# Patient Record
Sex: FEMALE | Race: WHITE | URBAN - METROPOLITAN AREA
[De-identification: names, ages, dates, MRNs, and addresses within clinical notes are randomized per-mention and may not be internally consistent; named-entity substitution may affect disease eponyms.]

---

## 2020-07-10 ENCOUNTER — TELEPHONE ENCOUNTER (OUTPATIENT)
Dept: URBAN - METROPOLITAN AREA CLINIC 74 | Facility: CLINIC | Age: 30
End: 2020-07-10

## 2020-07-29 ENCOUNTER — WEB ENCOUNTER (OUTPATIENT)
Dept: URBAN - METROPOLITAN AREA CLINIC 74 | Facility: CLINIC | Age: 30
End: 2020-07-29

## 2020-07-29 ENCOUNTER — OFFICE VISIT (OUTPATIENT)
Dept: URBAN - METROPOLITAN AREA CLINIC 74 | Facility: CLINIC | Age: 30
End: 2020-07-29
Payer: COMMERCIAL

## 2020-07-29 DIAGNOSIS — K80.20 GALLSTONES: ICD-10-CM

## 2020-07-29 DIAGNOSIS — K52.9 GASTROENTERITIS: ICD-10-CM

## 2020-07-29 DIAGNOSIS — Z86.2 HISTORY OF IRON DEFICIENCY ANEMIA: ICD-10-CM

## 2020-07-29 DIAGNOSIS — K50.111 CROHN'S DISEASE OF COLON WITH RECTAL BLEEDING: ICD-10-CM

## 2020-07-29 DIAGNOSIS — D18.03 LIVER HEMANGIOMA: ICD-10-CM

## 2020-07-29 DIAGNOSIS — E66.01 MORBID OBESITY DUE TO EXCESS CALORIES: ICD-10-CM

## 2020-07-29 PROCEDURE — G8419 CALC BMI OUT NRM PARAM NOF/U: HCPCS | Performed by: INTERNAL MEDICINE

## 2020-07-29 PROCEDURE — 99214 OFFICE O/P EST MOD 30 MIN: CPT | Performed by: INTERNAL MEDICINE

## 2020-07-29 PROCEDURE — 1036F TOBACCO NON-USER: CPT | Performed by: INTERNAL MEDICINE

## 2020-07-29 PROCEDURE — G8427 DOCREV CUR MEDS BY ELIG CLIN: HCPCS | Performed by: INTERNAL MEDICINE

## 2020-07-29 RX ORDER — USTEKINUMAB 90 MG/ML
INJECT 1 MILLILITER (90 MG) BY SUBCUTANEOUS ROUTE EVERY 8 WEEKS FOR 90 DAYS INJECTION, SOLUTION SUBCUTANEOUS
Qty: 2 | Refills: 3 | Status: ACTIVE | COMMUNITY
Start: 2020-04-10 | End: 2021-04-05

## 2020-07-29 RX ORDER — ESCITALOPRAM OXALATE 10 MG/1
1 TABLET TABLET, FILM COATED ORAL ONCE A DAY
Refills: 0 | Status: ACTIVE | COMMUNITY
Start: 1900-01-01

## 2020-07-29 RX ORDER — USTEKINUMAB 90 MG/ML
INJECT 1 MILLILITER (90 MG) BY SUBCUTANEOUS ROUTE EVERY 8 WEEKS FOR 90 DAYS INJECTION, SOLUTION SUBCUTANEOUS
OUTPATIENT
Start: 2020-04-10 | End: 2021-04-05

## 2020-07-29 NOTE — PHYSICAL EXAM CONSTITUTIONAL:
Morbid obesity, well developed, well nourished , in no acute distress , ambulating without difficulty , normal communication ability

## 2020-07-29 NOTE — HPI-TODAY'S VISIT:
Today July 29, 2020 the patient returns for a follow-up visit, the patient was last seen on April 14, 2020 during a telephone visit after having a colonoscopy that showed minimal active colitis compatible with Crohn's disease of the colon the terminal ileal mucosa was normal.  At the time the patient was having normal BMs, no rectal bleeding, she was concerned because she had been exposed to a family member with COVID19, she wanted to hold the Stelara for 2 weeks I discussed with her the current recommendations but she decided to hold the Stelara for 2 weeks, she was to return for a follow-up visit in 2 months. The patient now returns complaining of 2 days of intense diarrhea, she claims that she never stopped the Stelara, has not been exposed to COVID, has diffuse cramping abdominal pain, denies any hematochezia, has slight nausea with no vomiting.  There is no history of recent antibiotic use or travel. The patient will start clear liquids, will use Imodium over-the-counter 1 tablet every 4-6 hours as needed, we will obtain stool for pathogens, a CBC, CMP, CRP. The patient will contact the office in 24 hours to report progress, if not improving we may start prednisone 40 mg, obtain a CT of abdomen and pelvis, may need to start antibiotic therapy.

## 2020-07-30 LAB
A/G RATIO: 1.4
ALBUMIN: 4.5
ALKALINE PHOSPHATASE: 74
ALT (SGPT): 13
AST (SGOT): 15
BASO (ABSOLUTE): 0.1
BASOS: 1
BILIRUBIN, TOTAL: 0.4
BUN/CREATININE RATIO: 9
BUN: 8
C-REACTIVE PROTEIN, QUANT: 58
CALCIUM: 9.2
CARBON DIOXIDE, TOTAL: 22
CHLORIDE: 99
CREATININE: 0.93
EGFR IF AFRICN AM: 95
EGFR IF NONAFRICN AM: 82
EOS (ABSOLUTE): 0.2
EOS: 1
GLOBULIN, TOTAL: 3.2
GLUCOSE: 91
HEMATOCRIT: 40.9
HEMATOLOGY COMMENTS:: (no result)
HEMOGLOBIN: 13.5
IMMATURE CELLS: (no result)
IMMATURE GRANS (ABS): 0
IMMATURE GRANULOCYTES: 0
LYMPHS (ABSOLUTE): 1.3
LYMPHS: 11
MCH: 27.8
MCHC: 33
MCV: 84
MONOCYTES(ABSOLUTE): 0.8
MONOCYTES: 7
NEUTROPHILS (ABSOLUTE): 9.9
NEUTROPHILS: 80
NRBC: (no result)
PLATELETS: 368
POTASSIUM: 4.2
PROTEIN, TOTAL: 7.7
RBC: 4.85
RDW: 12
SODIUM: 138
WBC: 12.2

## 2020-08-06 ENCOUNTER — TELEPHONE ENCOUNTER (OUTPATIENT)
Dept: URBAN - METROPOLITAN AREA CLINIC 74 | Facility: CLINIC | Age: 30
End: 2020-08-06

## 2020-08-06 RX ORDER — USTEKINUMAB 90 MG/ML
INJECT 1 MILLILITER (90 MG) BY SUBCUTANEOUS ROUTE EVERY 8 WEEKS FOR 90 DAYS INJECTION, SOLUTION SUBCUTANEOUS
Status: ACTIVE | COMMUNITY
Start: 2020-04-10 | End: 2021-04-05

## 2020-08-06 RX ORDER — PREDNISONE 5 MG/1
8 TABLETS FOR 7 DAYS, TAPER DOWN 1 TAB EVERY SEVEN DAYS TABLET ORAL ONCE A DAY
Qty: 200 | Refills: 2 | OUTPATIENT
Start: 2020-08-06 | End: 2020-11-03

## 2020-08-06 RX ORDER — ESCITALOPRAM OXALATE 10 MG/1
1 TABLET TABLET, FILM COATED ORAL ONCE A DAY
Refills: 0 | Status: ACTIVE | COMMUNITY
Start: 1900-01-01

## 2020-08-11 ENCOUNTER — OFFICE VISIT (OUTPATIENT)
Dept: URBAN - METROPOLITAN AREA CLINIC 74 | Facility: CLINIC | Age: 30
End: 2020-08-11

## 2020-08-12 PROBLEM — 47812002: Status: ACTIVE | Noted: 2020-08-10

## 2020-08-13 ENCOUNTER — OFFICE VISIT (OUTPATIENT)
Dept: URBAN - METROPOLITAN AREA CLINIC 74 | Facility: CLINIC | Age: 30
End: 2020-08-13

## 2020-08-26 ENCOUNTER — OFFICE VISIT (OUTPATIENT)
Dept: URBAN - METROPOLITAN AREA CLINIC 74 | Facility: CLINIC | Age: 30
End: 2020-08-26
Payer: COMMERCIAL

## 2020-08-26 DIAGNOSIS — E66.01 MORBID OBESITY: ICD-10-CM

## 2020-08-26 DIAGNOSIS — A02.0 SALMONELLA DYSENTERY: ICD-10-CM

## 2020-08-26 DIAGNOSIS — K50.118 CROHN'S DISEASE OF LARGE INTESTINE WITH OTHER COMPLICATION: ICD-10-CM

## 2020-08-26 DIAGNOSIS — Z86.2 HISTORY OF ANEMIA: ICD-10-CM

## 2020-08-26 PROBLEM — 42338000: Status: ACTIVE | Noted: 2020-08-12

## 2020-08-26 PROCEDURE — 1036F TOBACCO NON-USER: CPT | Performed by: INTERNAL MEDICINE

## 2020-08-26 PROCEDURE — G8419 CALC BMI OUT NRM PARAM NOF/U: HCPCS | Performed by: INTERNAL MEDICINE

## 2020-08-26 PROCEDURE — 99213 OFFICE O/P EST LOW 20 MIN: CPT | Performed by: INTERNAL MEDICINE

## 2020-08-26 PROCEDURE — G8427 DOCREV CUR MEDS BY ELIG CLIN: HCPCS | Performed by: INTERNAL MEDICINE

## 2020-08-26 RX ORDER — USTEKINUMAB 90 MG/ML
INJECT 1 MILLILITER (90 MG) BY SUBCUTANEOUS ROUTE EVERY 8 WEEKS FOR 90 DAYS INJECTION, SOLUTION SUBCUTANEOUS
Status: ACTIVE | COMMUNITY
Start: 2020-04-10 | End: 2021-04-05

## 2020-08-26 RX ORDER — USTEKINUMAB 90 MG/ML
INJECT 1 MILLILITER (90 MG) BY SUBCUTANEOUS ROUTE EVERY 8 WEEKS FOR 90 DAYS INJECTION, SOLUTION SUBCUTANEOUS
OUTPATIENT
Start: 2020-04-10 | End: 2021-04-05

## 2020-08-26 RX ORDER — PREDNISONE 5 MG/1
8 TABLETS FOR 7 DAYS, TAPER DOWN 1 TAB EVERY SEVEN DAYS TABLET ORAL ONCE A DAY
OUTPATIENT
Start: 2020-08-06 | End: 2020-11-03

## 2020-08-26 RX ORDER — ESCITALOPRAM OXALATE 10 MG/1
1 TABLET TABLET, FILM COATED ORAL ONCE A DAY
Refills: 0 | Status: ACTIVE | COMMUNITY
Start: 1900-01-01

## 2020-08-26 RX ORDER — PREDNISONE 5 MG/1
8 TABLETS FOR 7 DAYS, TAPER DOWN 1 TAB EVERY SEVEN DAYS TABLET ORAL ONCE A DAY
Qty: 200 | Refills: 2 | Status: ACTIVE | COMMUNITY
Start: 2020-08-06 | End: 2020-11-03

## 2020-08-26 NOTE — HPI-TODAY'S VISIT:
Today July 29, 2020 the patient returns for a follow-up visit, the patient was last seen on April 14, 2020 during a telephone visit after having a colonoscopy that showed minimal active colitis compatible with Crohn's disease of the colon the terminal ileal mucosa was normal.  At the time the patient was having normal BMs, no rectal bleeding, she was concerned because she had been exposed to a family member with COVID19, she wanted to hold the Stelara for 2 weeks I discussed with her the current recommendations but she decided to hold the Stelara for 2 weeks, she was to return for a follow-up visit in 2 months. The patient now returns complaining of 2 days of intense diarrhea, she claims that she never stopped the Stelara, has not been exposed to COVID, has diffuse cramping abdominal pain, denies any hematochezia, has slight nausea with no vomiting.  There is no history of recent antibiotic use or travel. The patient will start clear liquids, will use Imodium over-the-counter 1 tablet every 4-6 hours as needed, we will obtain stool for pathogens, a CBC, CMP, CRP. The patient will contact the office in 24 hours to report progress, if not improving we may start prednisone 40 mg, obtain a CT of abdomen and pelvis, may need to start antibiotic therapy. Today August 25, 2020 the patient returns for a follow-up visit, the patient was last seen in the office on July 13, 2020 complaining of 2 days of intense diarrhea, the patient never held the Stelara as we discussed during the previous visit, the patient complaint of diffuse cramping abdominal pain, denied any hematochezia, had slight nausea with no vomiting.  The patient denied any recent travel or antibiotic use.  The patient was started on clear liquids and over-the-counter Imodium as needed, lab was requested including a CBC, CMP CRP the patient was advised to report progress within 24 hours, if not better she was to start prednisone 40 mg daily and obtain a CT of the abdomen and pelvis. Laboratory obtained on July 30, 2020 showed a CRP of 58, and normal CMP, a CBC with a white cell count of 12.2, 9.9 absolute neutrophiles, normal H&H 13.5 and 40.9.  Because of persistent symptoms the patient was seen in the emergency room.  Lab in the emergency room included stool for white cells which were positive, Salmonella was detected, the patient was tested for SARS C0V2 and it was negative the patient's urine analysis revealed rare bacteria increased WBCs and 2+ leukoesterase. A CT of the abdomen showed mild circumferential wall thickening of the sigmoid colon and descending colon without any additional imaging findings to confirm active inflammatory bowel disease. During the emergency room visit the patient complained of leg cramping, general aching and appeared to be dehydrated.  Her sodium was 132 potassium 3.3, the patient had a normal creatinine of 0.78, EGFR over 90, her CBC revealed a white cell count of 12.95 with a normal H&H, neutrophiles 9.37. The patient was rehydrated and dismissed on dicyclomine 10 mg, ondansetron and subsequently was notified by the ER of the presence of the Salmonella and treated with antibiotics. Currently the patient is doing well, she is having normal bowel movements, no diarrhea and no rectal bleeding, no abdominal pain, the patient is down to 30 mg of prednisone a day and tapering down every fifth day 5 mg, she is due to have Stelara tomorrow. The patient will return for a follow-up visit in 3 months.

## 2020-10-26 ENCOUNTER — TELEPHONE ENCOUNTER (OUTPATIENT)
Dept: URBAN - METROPOLITAN AREA CLINIC 74 | Facility: CLINIC | Age: 30
End: 2020-10-26

## 2020-11-19 ENCOUNTER — OFFICE VISIT (OUTPATIENT)
Dept: URBAN - METROPOLITAN AREA CLINIC 74 | Facility: CLINIC | Age: 30
End: 2020-11-19

## 2020-11-30 ENCOUNTER — OFFICE VISIT (OUTPATIENT)
Dept: URBAN - METROPOLITAN AREA CLINIC 74 | Facility: CLINIC | Age: 30
End: 2020-11-30

## 2020-11-30 RX ORDER — USTEKINUMAB 90 MG/ML
INJECT 1 MILLILITER (90 MG) BY SUBCUTANEOUS ROUTE EVERY 8 WEEKS FOR 90 DAYS INJECTION, SOLUTION SUBCUTANEOUS
Status: ACTIVE | COMMUNITY
Start: 2020-04-10 | End: 2021-04-05

## 2020-11-30 RX ORDER — ESCITALOPRAM OXALATE 10 MG/1
1 TABLET TABLET, FILM COATED ORAL ONCE A DAY
Refills: 0 | Status: ACTIVE | COMMUNITY
Start: 1900-01-01

## 2020-11-30 RX ORDER — USTEKINUMAB 90 MG/ML
INJECT 1 MILLILITER (90 MG) BY SUBCUTANEOUS ROUTE EVERY 8 WEEKS FOR 90 DAYS INJECTION, SOLUTION SUBCUTANEOUS
OUTPATIENT

## 2021-04-20 ENCOUNTER — ERX REFILL RESPONSE (OUTPATIENT)
Dept: URBAN - METROPOLITAN AREA CLINIC 74 | Facility: CLINIC | Age: 31
End: 2021-04-20

## 2021-04-20 RX ORDER — USTEKINUMAB 90 MG/ML
MAINTENANCE: INJECT 1 SYRINGE SUBCUTANEOUSLY EVERY 8 WEEKS. REFRIGERATE. DO NOT FREEZE INJECTION, SOLUTION SUBCUTANEOUS
Qty: 1 | Refills: 2

## 2021-07-13 ENCOUNTER — TELEPHONE ENCOUNTER (OUTPATIENT)
Dept: URBAN - METROPOLITAN AREA CLINIC 74 | Facility: CLINIC | Age: 31
End: 2021-07-13

## 2021-10-18 ENCOUNTER — ERX REFILL RESPONSE (OUTPATIENT)
Dept: URBAN - METROPOLITAN AREA CLINIC 74 | Facility: CLINIC | Age: 31
End: 2021-10-18

## 2021-10-18 RX ORDER — USTEKINUMAB 90 MG/ML
INJECT 1 SYRINGE SUBCUTANEOUSLY EVERY 8 WEEKS. REFRIGERATE. DO NOT FREEZE INJECTION, SOLUTION SUBCUTANEOUS
Qty: 1 | Refills: 2 | OUTPATIENT

## 2021-10-18 RX ORDER — USTEKINUMAB 90 MG/ML
MAINTENANCE: INJECT 1 SYRINGE SUBCUTANEOUSLY EVERY 8 WEEKS. REFRIGERATE. DO NOT FREEZE INJECTION, SOLUTION SUBCUTANEOUS
Qty: 1 | Refills: 2 | OUTPATIENT

## 2021-10-26 ENCOUNTER — OFFICE VISIT (OUTPATIENT)
Dept: URBAN - METROPOLITAN AREA CLINIC 74 | Facility: CLINIC | Age: 31
End: 2021-10-26

## 2021-10-26 RX ORDER — ESCITALOPRAM OXALATE 10 MG/1
1 TABLET TABLET, FILM COATED ORAL ONCE A DAY
Refills: 0 | Status: ACTIVE | COMMUNITY
Start: 1900-01-01

## 2021-10-26 RX ORDER — USTEKINUMAB 90 MG/ML
INJECT 1 SYRINGE SUBCUTANEOUSLY EVERY 8 WEEKS. REFRIGERATE. DO NOT FREEZE INJECTION, SOLUTION SUBCUTANEOUS
Qty: 1 | Refills: 2 | Status: ACTIVE | COMMUNITY

## 2021-11-04 ENCOUNTER — OFFICE VISIT (OUTPATIENT)
Dept: URBAN - METROPOLITAN AREA CLINIC 74 | Facility: CLINIC | Age: 31
End: 2021-11-04

## 2021-11-04 RX ORDER — USTEKINUMAB 90 MG/ML
INJECT 1 SYRINGE SUBCUTANEOUSLY EVERY 8 WEEKS. REFRIGERATE. DO NOT FREEZE INJECTION, SOLUTION SUBCUTANEOUS
Qty: 1 | Refills: 2 | Status: ACTIVE | COMMUNITY

## 2021-11-04 RX ORDER — ESCITALOPRAM OXALATE 10 MG/1
1 TABLET TABLET, FILM COATED ORAL ONCE A DAY
Refills: 0 | Status: ACTIVE | COMMUNITY
Start: 1900-01-01

## 2021-11-15 ENCOUNTER — LAB OUTSIDE AN ENCOUNTER (OUTPATIENT)
Dept: URBAN - METROPOLITAN AREA CLINIC 74 | Facility: CLINIC | Age: 31
End: 2021-11-15

## 2021-11-15 ENCOUNTER — OFFICE VISIT (OUTPATIENT)
Dept: URBAN - METROPOLITAN AREA CLINIC 74 | Facility: CLINIC | Age: 31
End: 2021-11-15
Payer: COMMERCIAL

## 2021-11-15 VITALS
TEMPERATURE: 98.1 F | HEART RATE: 90 BPM | BODY MASS INDEX: 54.37 KG/M2 | SYSTOLIC BLOOD PRESSURE: 124 MMHG | DIASTOLIC BLOOD PRESSURE: 82 MMHG | WEIGHT: 288 LBS | HEIGHT: 61 IN | OXYGEN SATURATION: 100 %

## 2021-11-15 DIAGNOSIS — K50.118 CROHN'S DISEASE OF LARGE INTESTINE WITH OTHER COMPLICATION: ICD-10-CM

## 2021-11-15 DIAGNOSIS — D18.03 LIVER HEMANGIOMA: ICD-10-CM

## 2021-11-15 DIAGNOSIS — E66.01 MORBID OBESITY: ICD-10-CM

## 2021-11-15 PROBLEM — 161413004: Status: ACTIVE | Noted: 2021-11-15

## 2021-11-15 PROBLEM — 275538002: Status: ACTIVE | Noted: 2020-08-10

## 2021-11-15 PROBLEM — 428882003: Status: ACTIVE | Noted: 2021-11-15

## 2021-11-15 PROBLEM — 238136002: Status: ACTIVE | Noted: 2020-08-10

## 2021-11-15 PROBLEM — 7620006: Status: ACTIVE | Noted: 2020-08-10

## 2021-11-15 PROCEDURE — 99213 OFFICE O/P EST LOW 20 MIN: CPT | Performed by: INTERNAL MEDICINE

## 2021-11-15 RX ORDER — USTEKINUMAB 90 MG/ML
INJECT 1 SYRINGE SUBCUTANEOUSLY EVERY 8 WEEKS. REFRIGERATE. DO NOT FREEZE INJECTION, SOLUTION SUBCUTANEOUS
OUTPATIENT

## 2021-11-15 RX ORDER — USTEKINUMAB 90 MG/ML
INJECT 1 SYRINGE SUBCUTANEOUSLY EVERY 8 WEEKS. REFRIGERATE. DO NOT FREEZE INJECTION, SOLUTION SUBCUTANEOUS
Qty: 1 | Refills: 2 | Status: ACTIVE | COMMUNITY

## 2021-11-15 RX ORDER — ESCITALOPRAM OXALATE 10 MG/1
1 TABLET TABLET, FILM COATED ORAL ONCE A DAY
Refills: 0 | Status: DISCONTINUED | COMMUNITY
Start: 1900-01-01

## 2021-11-15 RX ORDER — SODIUM PICOSULFATE, MAGNESIUM OXIDE, AND ANHYDROUS CITRIC ACID 10; 3.5; 12 MG/160ML; G/160ML; G/160ML
160 ML LIQUID ORAL
Qty: 320 MILLILITER | Refills: 0 | OUTPATIENT
Start: 2021-11-15 | End: 2021-11-16

## 2021-11-15 NOTE — PHYSICAL EXAM CONSTITUTIONAL:
Morbidly obese well developed, well nourished , in no acute distress , ambulating without difficulty , normal communication ability

## 2021-11-15 NOTE — HPI-TODAY'S VISIT:
Today July 29, 2020 the patient returns for a follow-up visit, the patient was last seen on April 14, 2020 during a telephone visit after having a colonoscopy that showed minimal active colitis compatible with Crohn's disease of the colon the terminal ileal mucosa was normal.  At the time the patient was having normal BMs, no rectal bleeding, she was concerned because she had been exposed to a family member with COVID19, she wanted to hold the Stelara for 2 weeks I discussed with her the current recommendations but she decided to hold the Stelara for 2 weeks, she was to return for a follow-up visit in 2 months. The patient now returns complaining of 2 days of intense diarrhea, she claims that she never stopped the Stelara, has not been exposed to COVID, has diffuse cramping abdominal pain, denies any hematochezia, has slight nausea with no vomiting.  There is no history of recent antibiotic use or travel. The patient will start clear liquids, will use Imodium over-the-counter 1 tablet every 4-6 hours as needed, we will obtain stool for pathogens, a CBC, CMP, CRP. The patient will contact the office in 24 hours to report progress, if not improving we may start prednisone 40 mg, obtain a CT of abdomen and pelvis, may need to start antibiotic therapy. Today August 25, 2020 the patient returns for a follow-up visit, the patient was last seen in the office on July 13, 2020 complaining of 2 days of intense diarrhea, the patient never held the Stelara as we discussed during the previous visit, the patient complaint of diffuse cramping abdominal pain, denied any hematochezia, had slight nausea with no vomiting.  The patient denied any recent travel or antibiotic use.  The patient was started on clear liquids and over-the-counter Imodium as needed, lab was requested including a CBC, CMP CRP the patient was advised to report progress within 24 hours, if not better she was to start prednisone 40 mg daily and obtain a CT of the abdomen and pelvis. Laboratory obtained on July 30, 2020 showed a CRP of 58, and normal CMP, a CBC with a white cell count of 12.2, 9.9 absolute neutrophiles, normal H&H 13.5 and 40.9.  Because of persistent symptoms the patient was seen in the emergency room.  Lab in the emergency room included stool for white cells which were positive, Salmonella was detected, the patient was tested for SARS C0V2 and it was negative the patient's urine analysis revealed rare bacteria increased WBCs and 2+ leukoesterase. A CT of the abdomen showed mild circumferential wall thickening of the sigmoid colon and descending colon without any additional imaging findings to confirm active inflammatory bowel disease. During the emergency room visit the patient complained of leg cramping, general aching and appeared to be dehydrated.  Her sodium was 132 potassium 3.3, the patient had a normal creatinine of 0.78, EGFR over 90, her CBC revealed a white cell count of 12.95 with a normal H&H, neutrophiles 9.37. The patient was rehydrated and dismissed on dicyclomine 10 mg, ondansetron and subsequently was notified by the ER of the presence of the Salmonella and treated with antibiotics. Currently the patient is doing well, she is having normal bowel movements, no diarrhea and no rectal bleeding, no abdominal pain, the patient is down to 30 mg of prednisone a day and tapering down every fifth day 5 mg, she is due to have Stelara tomorrow. The patient will return for a follow-up visit in 3 months.  Today November 15 2021 the patient returns for a follow-up visit, the patient was last seen in the office on November 30, 2020 with Crohn's disease of the large intestine, history of Salmonella dysentery, history of anemia and morbid obesity.  At the time of the last visit the patient appeared to be doing well, she was having normal bowel movements, denies having diarrhea, rectal bleeding.  There was no abdominal pain, the patient was tapering down prednisone and was down to 30 mg of prednisone a day and was tapering down to 5 mg every fifth day, she was due to have a Stelara infusion.  The patient's last CT of the abdomen showed mild circumferential wall thickening of the sigmoid colon and descending colon without any additional imaging findings to confirm active inflammatory bowel disease.  At the time of the last visit to the ER the patient had a normal H&H, slight leukocytosis 12.95 and normal renal function, she had a low sodium of 132 and potassium of 3.3.  The patient was advised to continue using Stelara 90 mg every 8 weeks.  Today the patient returns to the office stating that she is doing much better, since she started using Stelara which replaced Humira her bowel movements became regular formed with no blood.  The patient states that by week 7 after her injection she does get softer stool, maybe 2-3 a day when normally she has 1 formed stool a day, there has been no blood, she might have slight fecal urgency, no abdominal bloating and no significant cramps.  The patient denied having any upper GI symptoms.  The patient was advised to get a CBC, CMP, CRP, sedimentation rate, calprotectin.  Will be scheduled to have a follow-up colonoscopy in January 2022 so we can reevaluate the Stelara dose and make adjustments.  Patient may need to have the Stelara injected every 7 weeks or might need to get an infusion so she can remain at an 8-week interval.  We will contact the patient with lab results and have her return after the colonoscopy.

## 2021-11-16 LAB
A/G RATIO: 1.4
ALBUMIN: 4.2
ALKALINE PHOSPHATASE: 68
ALT (SGPT): 16
AST (SGOT): 15
BASO (ABSOLUTE): 0.1
BASOS: 1
BILIRUBIN, TOTAL: 0.4
BUN/CREATININE RATIO: 13
BUN: 9
C-REACTIVE PROTEIN, QUANT: 14
CALCIUM: 9.3
CARBON DIOXIDE, TOTAL: 22
CHLORIDE: 100
CREATININE: 0.67
EGFR IF AFRICN AM: 135
EGFR IF NONAFRICN AM: 117
EOS (ABSOLUTE): 0.2
EOS: 2
GLOBULIN, TOTAL: 3
GLUCOSE: 84
HEMATOCRIT: 39.2
HEMATOLOGY COMMENTS:: (no result)
HEMOGLOBIN: 12.1
IMMATURE CELLS: (no result)
IMMATURE GRANS (ABS): 0
IMMATURE GRANULOCYTES: 0
LYMPHS (ABSOLUTE): 2.3
LYMPHS: 22
MCH: 24.3
MCHC: 30.9
MCV: 79
MONOCYTES(ABSOLUTE): 0.6
MONOCYTES: 6
NEUTROPHILS (ABSOLUTE): 7.3
NEUTROPHILS: 69
NRBC: (no result)
PLATELETS: 357
POTASSIUM: 4.3
PROTEIN, TOTAL: 7.2
RBC: 4.98
RDW: 14.6
SEDIMENTATION RATE-WESTERGREN: 71
SODIUM: 138
WBC: 10.5

## 2021-11-18 ENCOUNTER — TELEPHONE ENCOUNTER (OUTPATIENT)
Dept: URBAN - METROPOLITAN AREA CLINIC 74 | Facility: CLINIC | Age: 31
End: 2021-11-18

## 2021-12-14 ENCOUNTER — TELEPHONE ENCOUNTER (OUTPATIENT)
Dept: URBAN - METROPOLITAN AREA CLINIC 23 | Facility: CLINIC | Age: 31
End: 2021-12-14

## 2021-12-20 ENCOUNTER — OFFICE VISIT (OUTPATIENT)
Dept: URBAN - METROPOLITAN AREA SURGERY CENTER 30 | Facility: SURGERY CENTER | Age: 31
End: 2021-12-20
Payer: COMMERCIAL

## 2021-12-20 DIAGNOSIS — K50.10 CC (CROHN'S COLITIS): ICD-10-CM

## 2021-12-20 PROCEDURE — G8907 PT DOC NO EVENTS ON DISCHARG: HCPCS | Performed by: INTERNAL MEDICINE

## 2021-12-20 PROCEDURE — 45380 COLONOSCOPY AND BIOPSY: CPT | Performed by: INTERNAL MEDICINE

## 2021-12-27 ENCOUNTER — TELEPHONE ENCOUNTER (OUTPATIENT)
Dept: URBAN - METROPOLITAN AREA CLINIC 74 | Facility: CLINIC | Age: 31
End: 2021-12-27

## 2022-01-12 ENCOUNTER — OFFICE VISIT (OUTPATIENT)
Dept: URBAN - METROPOLITAN AREA CLINIC 74 | Facility: CLINIC | Age: 32
End: 2022-01-12

## 2022-01-18 ENCOUNTER — OFFICE VISIT (OUTPATIENT)
Dept: URBAN - METROPOLITAN AREA SURGERY CENTER 30 | Facility: SURGERY CENTER | Age: 32
End: 2022-01-18

## 2022-02-07 ENCOUNTER — OFFICE VISIT (OUTPATIENT)
Dept: URBAN - METROPOLITAN AREA CLINIC 74 | Facility: CLINIC | Age: 32
End: 2022-02-07

## 2022-05-09 ENCOUNTER — ERX REFILL RESPONSE (OUTPATIENT)
Dept: URBAN - METROPOLITAN AREA CLINIC 74 | Facility: CLINIC | Age: 32
End: 2022-05-09

## 2022-05-09 RX ORDER — USTEKINUMAB 90 MG/ML
MAINTENANCE: INJECT 1 SYRINGE SUBCUTANEOUSLY EVERY 8 WEEKS. REFRIGERATE. DO NOT FREEZE INJECTION, SOLUTION SUBCUTANEOUS
Qty: 1 | Refills: 2 | OUTPATIENT

## 2022-05-09 RX ORDER — USTEKINUMAB 90 MG/ML
INJECT 1 SYRINGE SUBCUTANEOUSLY EVERY 8 WEEKS. REFRIGERATE. DO NOT FREEZE INJECTION, SOLUTION SUBCUTANEOUS
Qty: 1 | Refills: 2 | OUTPATIENT

## 2022-11-15 ENCOUNTER — ERX REFILL RESPONSE (OUTPATIENT)
Dept: URBAN - METROPOLITAN AREA CLINIC 74 | Facility: CLINIC | Age: 32
End: 2022-11-15

## 2022-11-15 RX ORDER — USTEKINUMAB 90 MG/ML
MAINTENANCE: INJECT 1 SYRINGE SUBCUTANEOUSLY EVERY 8 WEEKS. REFRIGERATE. DO NOT FREEZE INJECTION, SOLUTION SUBCUTANEOUS
Qty: 1 | Refills: 2 | OUTPATIENT

## 2022-11-15 RX ORDER — USTEKINUMAB 90 MG/ML
MAINTENANCE: INJECT 1 SYRINGE SUBCUTANEOUSLY EVERY 8 WEEKS. REFRIGERATE. DO NOT FREEZE INJECTION, SOLUTION SUBCUTANEOUS
Qty: 1 | Refills: 1 | OUTPATIENT

## 2022-12-14 ENCOUNTER — TELEPHONE ENCOUNTER (OUTPATIENT)
Dept: URBAN - METROPOLITAN AREA CLINIC 74 | Facility: CLINIC | Age: 32
End: 2022-12-14

## 2023-03-01 PROBLEM — 70342003: Status: ACTIVE | Noted: 2023-03-01

## 2023-03-01 PROBLEM — 50440006: Status: ACTIVE | Noted: 2023-03-01

## 2023-03-14 ENCOUNTER — OFFICE VISIT (OUTPATIENT)
Dept: URBAN - METROPOLITAN AREA CLINIC 74 | Facility: CLINIC | Age: 33
End: 2023-03-14

## 2023-03-14 NOTE — HPI-TODAY'S VISIT:
The patient is 33-year-old female with a long history of Crohn's disease currently in remission on Stelara well-known to me, is here today for follow-up appointment to discuss her recent US report.  Diagnostic studies: -- RUQ US on 02/23/2023 noted cholelithiasis without ultrasound findings to suggest cholecystitis.  -- HIDA scan  -- Labs on 02/23/2023 CMP with ALP 80, AST 12, ALT 11, and TB 0.2.  CBC with hemoglobin 1111.2, hematocrit 35.9, platelet 146, and WBC 14.31.  Lipase 36.  UA with trace of blood.  -- CT scan on 08/06/2020 with second (wall thickening of the sigmoid colon and descending colon without additional imaging findings to confirm active inflammatory bowel disease.  However if the patient is acutely symptomatic, findings could represent active Crohn's disease. Stable 1 cm right hepatic lobe lesion suggestive of a hemangioma.   Procedures: -- Colonoscopy with biopsy on 12/20/2021 by Dr. Boland, noted altered vascular, atrophic, and petechial mucosa in the rectal sigmoid, In the descending colon, and in the cecum.  The examined portion of the ileum was normal.  Biopsy of the terminal ileum with no significant history of pathology.  No histological evidence of active ileitis.  Random biopsy of the colon with focal active colitis.  No granulomas or dysplasia noted.

## 2023-04-27 ENCOUNTER — OFFICE VISIT (OUTPATIENT)
Dept: URBAN - METROPOLITAN AREA CLINIC 74 | Facility: CLINIC | Age: 33
End: 2023-04-27

## 2023-05-04 ENCOUNTER — OFFICE VISIT (OUTPATIENT)
Dept: URBAN - METROPOLITAN AREA CLINIC 74 | Facility: CLINIC | Age: 33
End: 2023-05-04
Payer: COMMERCIAL

## 2023-05-04 VITALS
TEMPERATURE: 97.5 F | DIASTOLIC BLOOD PRESSURE: 80 MMHG | SYSTOLIC BLOOD PRESSURE: 136 MMHG | HEIGHT: 61 IN | WEIGHT: 290 LBS | BODY MASS INDEX: 54.75 KG/M2 | HEART RATE: 92 BPM

## 2023-05-04 DIAGNOSIS — Z86.2 HISTORY OF ANEMIA: ICD-10-CM

## 2023-05-04 DIAGNOSIS — E66.01 MORBID OBESITY: ICD-10-CM

## 2023-05-04 DIAGNOSIS — Z90.49 HISTORY OF CHOLECYSTECTOMY: ICD-10-CM

## 2023-05-04 DIAGNOSIS — K50.10 CROHN'S DISEASE OF COLON WITHOUT COMPLICATION: ICD-10-CM

## 2023-05-04 DIAGNOSIS — Z87.19 HISTORY OF GALLSTONES: ICD-10-CM

## 2023-05-04 DIAGNOSIS — D18.03 LIVER HEMANGIOMA: ICD-10-CM

## 2023-05-04 PROBLEM — 407637009: Status: ACTIVE | Noted: 2023-05-04

## 2023-05-04 PROCEDURE — 99213 OFFICE O/P EST LOW 20 MIN: CPT | Performed by: INTERNAL MEDICINE

## 2023-05-04 RX ORDER — USTEKINUMAB 90 MG/ML
MAINTENANCE: INJECT 1 SYRINGE SUBCUTANEOUSLY EVERY 8 WEEKS. REFRIGERATE. DO NOT FREEZE INJECTION, SOLUTION SUBCUTANEOUS
Qty: 1 | Refills: 1 | Status: ACTIVE | COMMUNITY

## 2023-05-04 RX ORDER — USTEKINUMAB 90 MG/ML
MAINTENANCE: INJECT 1 SYRINGE SUBCUTANEOUSLY EVERY 8 WEEKS. REFRIGERATE. DO NOT FREEZE INJECTION, SOLUTION SUBCUTANEOUS
Qty: 6 | Refills: 3

## 2023-05-04 NOTE — PHYSICAL EXAM CONSTITUTIONAL:
Morbidly obese, well developed, well nourished, in no acute distress, ambulating without difficulty, normal communication ability

## 2023-05-05 LAB — C-REACTIVE PROTEIN, QUANT: 19.4

## 2023-08-02 ENCOUNTER — OFFICE VISIT (OUTPATIENT)
Dept: URBAN - METROPOLITAN AREA CLINIC 74 | Facility: CLINIC | Age: 33
End: 2023-08-02

## 2023-08-02 RX ORDER — USTEKINUMAB 90 MG/ML
MAINTENANCE: INJECT 1 SYRINGE SUBCUTANEOUSLY EVERY 8 WEEKS. REFRIGERATE. DO NOT FREEZE INJECTION, SOLUTION SUBCUTANEOUS
Qty: 6 | Refills: 3 | Status: ACTIVE | COMMUNITY

## 2023-08-03 ENCOUNTER — OFFICE VISIT (OUTPATIENT)
Dept: URBAN - METROPOLITAN AREA CLINIC 74 | Facility: CLINIC | Age: 33
End: 2023-08-03

## 2023-09-21 ENCOUNTER — OFFICE VISIT (OUTPATIENT)
Dept: URBAN - METROPOLITAN AREA CLINIC 74 | Facility: CLINIC | Age: 33
End: 2023-09-21

## 2023-09-21 RX ORDER — USTEKINUMAB 90 MG/ML
MAINTENANCE: INJECT 1 SYRINGE SUBCUTANEOUSLY EVERY 8 WEEKS. REFRIGERATE. DO NOT FREEZE INJECTION, SOLUTION SUBCUTANEOUS
Qty: 6 | Refills: 3 | Status: ACTIVE | COMMUNITY

## 2023-09-21 RX ORDER — USTEKINUMAB 90 MG/ML
MAINTENANCE: INJECT 1 SYRINGE SUBCUTANEOUSLY EVERY 8 WEEKS. REFRIGERATE. DO NOT FREEZE INJECTION, SOLUTION SUBCUTANEOUS
Qty: 6 | Refills: 3

## 2023-09-21 NOTE — HPI-TODAY'S VISIT:
The patient is 33-year-old female with a long history of Crohn's disease currently in remission on Stelara well-known to me, is here today for follow-up appointment to discuss her recent US report.  Diagnostic studies: -- RUQ US on 02/23/2023 noted cholelithiasis without ultrasound findings to suggest cholecystitis.  -- HIDA scan  -- Labs on 02/23/2023 CMP with ALP 80, AST 12, ALT 11, and TB 0.2.  CBC with hemoglobin 1111.2, hematocrit 35.9, platelet 146, and WBC 14.31.  Lipase 36.  UA with trace of blood.  -- CT scan on 08/06/2020 with second (wall thickening of the sigmoid colon and descending colon without additional imaging findings to confirm active inflammatory bowel disease.  However if the patient is acutely symptomatic, findings could represent active Crohn's disease. Stable 1 cm right hepatic lobe lesion suggestive of a hemangioma.   Procedures: -- Colonoscopy with biopsy on 12/20/2021 by Dr. Boland, noted altered vascular, atrophic, and petechial mucosa in the rectal sigmoid, In the descending colon, and in the cecum.  The examined portion of the ileum was normal.  Biopsy of the terminal ileum with no significant history of pathology.  No histological evidence of active ileitis.  Random biopsy of the colon with focal active colitis.  No granulomas or dysplasia noted.  Today May 4, 2023 the patient returns for a follow-up visit, the patient was last seen by Tamy Mosher on March 14, 2023 with Crohn's disease, calculus gallbladder disease without cholecystitis.  At the time of the visit the patient returned to the office to discuss her recent ultrasound, the patient appeared to be in remission on Stelara.  The right upper quadrant ultrasound performed February 23, 2023 revealed cholelithiasis without ultrasound findings of cholecystitis.  Laboratory on February 23, 2023 revealed an alkaline phosphatase of 80, AST 12, ALT 11, total bilirubin 0.2, the CBC revealed an H&H of 11.2 and 35.9 with a white cell count of 14.31 and lipase of 36, urine analysis revealed trace of blood.  A CT scan performed on 06/20/2020 revealed wall thickening of the sigmoid colon and descending colon without other active inflammatory changes bilaterally time the patient was acutely symptomatic.  The patient has a 1 cm liver hemangioma.  A colonoscopy with biopsies performed December 20, 2021 showed altered vascular atrophic mucosa with petechia in the rectosigmoid, descending colon and cecum.  The ileum was normal biopsies of the terminal ileum failed to show any abnormalities there was no evidence of ileitis, random biopsies of the colon showed focal active colitis with no granulomas or dysplasia.  Today the patient office stating that she is doing well, the patient has heard cholecystectomy at the end of March 2023 for symptomatic cholelithiasis.  The patient had an operative cholangiogram which was normal.  Currently the patient denies having any abdominal pain, jaundice, fever.  She has recovered well from surgery.  The patient denies having any change in bowel habit, denies having any fecal urgency.  The patient also denies having any rectal tenesmus, there is no diarrhea, constipation, rectal bleeding or hematochezia, there is no abdominal pain.  The patient remains on Stelara 90 mg every 8 weeks subcutaneous.  The patient has been advised to get a C-reactive protein.  The patient will be due to have a surveillance colonoscopy towards the end of 2023.  We will contact the patient with lab results and recommendations.  The patient remains morbidly obese and should lose weight.  Today September 21, 2023 the patient returns for a follow-up visit, the patient was last seen on May 4, 2023 with Crohn's disease without complication, history of anemia, history of gallstones, history of cholecystectomy, morbid obesity and a liver hemangioma.  At the time of the last visit the patient appeared to be doing well, the patient did have a cholecystectomy at the end of March 2023 for symptomatic cholelithiasis.  The patient had an operative cholangiogram which was normal.  The patient denies having abdominal pain, jaundice or fever, the patient recovered well from surgery, denies having any change in bowel habit denies having any fecal urgency.  The patient denies having any rectal tenesmus, there was no diarrhea, constipation, rectal bleeding, hematochezia or significant abdominal pain.  The patient was taking Stelara 90 mg every 8 weeks, at the time we recommended she get a C-reactive protein, the patient was also advised to get a surveillance colonoscopy at the end of 2023.  The patient was advised to lose weight.  The C-reactive protein on May 4, 2023 was 19.4 with a high normal of 8.0 in November 2021 he was 13 and in July 2020 2058.  The patient's previous colonoscopy performed December 20, 2021 revealed normal terminal ileum, and focal active colitis on random biopsies.  Endoscopically the patient had altered vascular pattern on the ascending colon and rectum.  The patient did not have any granulomatous, there was no malignancy or dysplasia.

## 2023-11-16 ENCOUNTER — OFFICE VISIT (OUTPATIENT)
Dept: URBAN - METROPOLITAN AREA CLINIC 74 | Facility: CLINIC | Age: 33
End: 2023-11-16

## 2023-11-29 ENCOUNTER — LAB OUTSIDE AN ENCOUNTER (OUTPATIENT)
Dept: URBAN - METROPOLITAN AREA CLINIC 74 | Facility: CLINIC | Age: 33
End: 2023-11-29

## 2023-11-29 ENCOUNTER — OFFICE VISIT (OUTPATIENT)
Dept: URBAN - METROPOLITAN AREA CLINIC 74 | Facility: CLINIC | Age: 33
End: 2023-11-29
Payer: COMMERCIAL

## 2023-11-29 VITALS
DIASTOLIC BLOOD PRESSURE: 88 MMHG | BODY MASS INDEX: 49.65 KG/M2 | HEIGHT: 61 IN | WEIGHT: 263 LBS | OXYGEN SATURATION: 98 % | HEART RATE: 78 BPM | SYSTOLIC BLOOD PRESSURE: 118 MMHG | TEMPERATURE: 97.5 F

## 2023-11-29 DIAGNOSIS — E66.01 MORBID OBESITY: ICD-10-CM

## 2023-11-29 DIAGNOSIS — Z86.2 HISTORY OF ANEMIA: ICD-10-CM

## 2023-11-29 DIAGNOSIS — Z90.49 HISTORY OF CHOLECYSTECTOMY: ICD-10-CM

## 2023-11-29 DIAGNOSIS — Z87.19 HISTORY OF GALLSTONES: ICD-10-CM

## 2023-11-29 DIAGNOSIS — Z87.01 HISTORY OF PNEUMONIA: ICD-10-CM

## 2023-11-29 DIAGNOSIS — K50.10 CROHN'S DISEASE OF COLON WITHOUT COMPLICATION: ICD-10-CM

## 2023-11-29 DIAGNOSIS — D18.03 LIVER HEMANGIOMA: ICD-10-CM

## 2023-11-29 PROBLEM — 161525004: Status: ACTIVE | Noted: 2023-11-29

## 2023-11-29 PROCEDURE — 99214 OFFICE O/P EST MOD 30 MIN: CPT | Performed by: INTERNAL MEDICINE

## 2023-11-29 RX ORDER — USTEKINUMAB 90 MG/ML
MAINTENANCE: INJECT 1 SYRINGE SUBCUTANEOUSLY EVERY 8 WEEKS. REFRIGERATE. DO NOT FREEZE INJECTION, SOLUTION SUBCUTANEOUS
Qty: 6 | Refills: 3 | Status: ACTIVE | COMMUNITY

## 2023-11-29 NOTE — PHYSICAL EXAM GASTROINTESTINAL
Abdomen , soft, nontender, nondistended , no guarding or rigidity , no masses palpable , normal bowel sounds , Liver and Spleen,  no hepatosplenomegaly , liver nontender
None

## 2023-11-29 NOTE — HPI-TODAY'S VISIT:
The patient is 33-year-old female with a long history of Crohn's disease currently in remission on Stelara well-known to me, is here today for follow-up appointment to discuss her recent US report.  Diagnostic studies: -- RUQ US on 02/23/2023 noted cholelithiasis without ultrasound findings to suggest cholecystitis.  -- HIDA scan  -- Labs on 02/23/2023 CMP with ALP 80, AST 12, ALT 11, and TB 0.2.  CBC with hemoglobin 1111.2, hematocrit 35.9, platelet 146, and WBC 14.31.  Lipase 36.  UA with trace of blood.  -- CT scan on 08/06/2020 with second (wall thickening of the sigmoid colon and descending colon without additional imaging findings to confirm active inflammatory bowel disease.  However if the patient is acutely symptomatic, findings could represent active Crohn's disease. Stable 1 cm right hepatic lobe lesion suggestive of a hemangioma.   Procedures: -- Colonoscopy with biopsy on 12/20/2021 by Dr. Boland, noted altered vascular, atrophic, and petechial mucosa in the rectal sigmoid, In the descending colon, and in the cecum.  The examined portion of the ileum was normal.  Biopsy of the terminal ileum with no significant history of pathology.  No histological evidence of active ileitis.  Random biopsy of the colon with focal active colitis.  No granulomas or dysplasia noted.  Today May 4, 2023 the patient returns for a follow-up visit, the patient was last seen by Tamy Mosher on March 14, 2023 with Crohn's disease, calculus gallbladder disease without cholecystitis.  At the time of the visit the patient returned to the office to discuss her recent ultrasound, the patient appeared to be in remission on Stelara.  The right upper quadrant ultrasound performed February 23, 2023 revealed cholelithiasis without ultrasound findings of cholecystitis.  Laboratory on February 23, 2023 revealed an alkaline phosphatase of 80, AST 12, ALT 11, total bilirubin 0.2, the CBC revealed an H&H of 11.2 and 35.9 with a white cell count of 14.31 and lipase of 36, urine analysis revealed trace of blood.  A CT scan performed on 06/20/2020 revealed wall thickening of the sigmoid colon and descending colon without other active inflammatory changes bilaterally time the patient was acutely symptomatic.  The patient has a 1 cm liver hemangioma.  A colonoscopy with biopsies performed December 20, 2021 showed altered vascular atrophic mucosa with petechia in the rectosigmoid, descending colon and cecum.  The ileum was normal biopsies of the terminal ileum failed to show any abnormalities there was no evidence of ileitis, random biopsies of the colon showed focal active colitis with no granulomas or dysplasia.  Today the patient office stating that she is doing well, the patient has heard cholecystectomy at the end of March 2023 for symptomatic cholelithiasis.  The patient had an operative cholangiogram which was normal.  Currently the patient denies having any abdominal pain, jaundice, fever.  She has recovered well from surgery.  The patient denies having any change in bowel habit, denies having any fecal urgency.  The patient also denies having any rectal tenesmus, there is no diarrhea, constipation, rectal bleeding or hematochezia, there is no abdominal pain.  The patient remains on Stelara 90 mg every 8 weeks subcutaneous.  The patient has been advised to get a C-reactive protein.  The patient will be due to have a surveillance colonoscopy towards the end of 2023.  We will contact the patient with lab results and recommendations.  The patient remains morbidly obese and should lose weight.  Today September 21, 2023 the patient returns for a follow-up visit, the patient was last seen on May 4, 2023 with Crohn's disease without complication, history of anemia, history of gallstones, history of cholecystectomy, morbid obesity and a liver hemangioma.  At the time of the last visit the patient appeared to be doing well, the patient did have a cholecystectomy at the end of March 2023 for symptomatic cholelithiasis.  The patient had an operative cholangiogram which was normal.  The patient denies having abdominal pain, jaundice or fever, the patient recovered well from surgery, denies having any change in bowel habit denies having any fecal urgency.  The patient denies having any rectal tenesmus, there was no diarrhea, constipation, rectal bleeding, hematochezia or significant abdominal pain.  The patient was taking Stelara 90 mg every 8 weeks, at the time we recommended she get a C-reactive protein, the patient was also advised to get a surveillance colonoscopy at the end of 2023.  The patient was advised to lose weight.  The C-reactive protein on May 4, 2023 was 19.4 with a high normal of 8.0 in November 2021 he was 13 and in July 2020 2058.  The patient's previous colonoscopy performed December 20, 2021 revealed normal terminal ileum, and focal active colitis on random biopsies.  Endoscopically the patient had altered vascular pattern on the ascending colon and rectum.  The patient did not have any granulomatous, there was no malignancy or dysplasia.  Today November 29, 2023 the patient returns for a follow-up visit, the patient was last seen on September 21, 2023 with Crohn's disease of the colon, history of gallstones, history of cholecystectomy, history of anemia, liver hemangioma and morbid obesity.At the time of the last visit the patient appeared to be doing well, the patient did have a cholecystectomy in March 2023 for cholelithiasis, the patient had a normal operative cholangiogram. During the visit the patient denied having any abdominal pain, jaundice or fever, the patient did recover from surgery well, denied having any change in bowel habit, fecal urgency or rectal tenesmus.  The patient's bowel movements were normal, the patient denied having rectal bleeding or hematochezia.  There was no abdominal pain. The patient was being treated with Stelara 90 mg every 8 weeks.  At the time we ordered a C-reactive protein and we ordered a colonoscopy to be performed at the end of 2023.  We encouraged the patient to lose weight. C-reactive protein on May 4, 2023 was 19.4 with a high normal of 8, in November 2021 he was 14. The colonoscopy performed on December 20, 2021 revealed normal terminal ileum, there was focal active colitis on random biopsies.  Endoscopically the patient had an altered vascular pattern on the ascending colon and rectum.  Biopsies failed to show any granulomatous changes no malignancy or dysplasia were seen.  Today the patient returns to the office stating that she developed recurrent pneumonia, was treated repeatedly with antibiotics and to one occasion with steroids in between September or October and November, last Stelara dose was in September, the patient is due to get her Stelara 90 mg subcutaneous next week.The patient currently denies having any upper respiratory symptoms such as chest discomfort, shortness of breath or fever. Currently she is having type IV stools on the Scurry scale, denies having any diarrhea, constipation, rectal bleeding or hematochezia, there is no abdominal pain. The patient will be due to have a surveillance colonoscopy which is to be performed in January 2024, the previous colonoscopy was performed in December 2021 and it revealed a normal terminal ileum, random colon biopsies showed focal active colitis with no granulomas or dysplasia. The patient will have a CBC, CMP, CRP drawn today. In view of the previous history of liver hemangioma she is to get a follow-up right upper quadrant ultrasound, the patient is morbidly obese and will add a fib 4 to the CMP. The patient will remain on Stelara 90 mg subcutaneous every 8 weeks and will return for a follow-up visit after completion of testing.

## 2023-11-30 LAB
ABSOLUTE BASOPHILS: 101
ABSOLUTE EOSINOPHILS: 323
ABSOLUTE LYMPHOCYTES: 2475
ABSOLUTE MONOCYTES: 545
ABSOLUTE NEUTROPHILS: 6656
ALBUMIN/GLOBULIN RATIO: 1.2
ALBUMIN: 4.2
ALKALINE PHOSPHATASE: 72
ALT: 16
AST: 16
BASOPHILS: 1
BILIRUBIN, TOTAL: 0.5
BUN/CREATININE RATIO: (no result)
C-REACTIVE PROTEIN, QUANT: 17.2
CALCIUM: 9.3
CARBON DIOXIDE: 25
CHLORIDE: 101
CREATININE: 0.74
EGFR: 109
EOSINOPHILS: 3.2
FIB 4 INDEX: 0.28
GLOBULIN: 3.4
GLUCOSE: 75
HEMATOCRIT: 34.6
HEMOGLOBIN: 10.9
LYMPHOCYTES: 24.5
MCH: 22.7
MCHC: 31.5
MCV: 72.1
MONOCYTES: 5.4
MPV: 10.5
NEUTROPHILS: 65.9
PLATELET COUNT: 481
PLATELET COUNT: 481
POTASSIUM: 4.4
PROTEIN, TOTAL: 7.6
RDW: 15.9
RED BLOOD CELL COUNT: 4.8
SODIUM: 138
UREA NITROGEN (BUN): 10
WHITE BLOOD CELL COUNT: 10.1

## 2024-01-03 ENCOUNTER — TELEPHONE ENCOUNTER (OUTPATIENT)
Dept: URBAN - METROPOLITAN AREA CLINIC 74 | Facility: CLINIC | Age: 34
End: 2024-01-03

## 2024-01-03 RX ORDER — USTEKINUMAB 90 MG/ML
MAINTENANCE: INJECT 1 SYRINGE SUBCUTANEOUSLY EVERY 8 WEEKS. REFRIGERATE. DO NOT FREEZE INJECTION, SOLUTION SUBCUTANEOUS
Qty: 6 | Refills: 3 | OUTPATIENT

## 2024-01-05 ENCOUNTER — OFFICE VISIT (OUTPATIENT)
Dept: URBAN - METROPOLITAN AREA CLINIC 39 | Facility: CLINIC | Age: 34
End: 2024-01-05

## 2024-01-09 ENCOUNTER — OFFICE VISIT (OUTPATIENT)
Dept: URBAN - METROPOLITAN AREA SURGERY CENTER 30 | Facility: SURGERY CENTER | Age: 34
End: 2024-01-09

## 2024-01-18 ENCOUNTER — TELEPHONE ENCOUNTER (OUTPATIENT)
Dept: URBAN - METROPOLITAN AREA CLINIC 40 | Facility: CLINIC | Age: 34
End: 2024-01-18

## 2024-01-18 RX ORDER — USTEKINUMAB 90 MG/ML
MAINTENANCE: INJECT 1 SYRINGE SUBCUTANEOUSLY EVERY 8 WEEKS. REFRIGERATE. DO NOT FREEZE INJECTION, SOLUTION SUBCUTANEOUS
Qty: 6 | Refills: 3
End: 2025-01-12

## 2024-01-19 ENCOUNTER — OFFICE VISIT (OUTPATIENT)
Dept: URBAN - METROPOLITAN AREA CLINIC 39 | Facility: CLINIC | Age: 34
End: 2024-01-19

## 2024-01-22 ENCOUNTER — TELEPHONE ENCOUNTER (OUTPATIENT)
Dept: URBAN - METROPOLITAN AREA CLINIC 40 | Facility: CLINIC | Age: 34
End: 2024-01-22

## 2024-01-22 RX ORDER — USTEKINUMAB 90 MG/ML
MAINTENANCE: INJECT 1 SYRINGE SUBCUTANEOUSLY EVERY 8 WEEKS. REFRIGERATE. DO NOT FREEZE INJECTION, SOLUTION SUBCUTANEOUS
Qty: 1 | Refills: 3 | OUTPATIENT

## 2024-01-26 ENCOUNTER — WEB ENCOUNTER (OUTPATIENT)
Dept: URBAN - METROPOLITAN AREA CLINIC 74 | Facility: CLINIC | Age: 34
End: 2024-01-26

## 2024-01-30 ENCOUNTER — CLAIMS CREATED FROM THE CLAIM WINDOW (OUTPATIENT)
Dept: URBAN - METROPOLITAN AREA CLINIC 4 | Facility: CLINIC | Age: 34
End: 2024-01-30
Payer: COMMERCIAL

## 2024-01-30 ENCOUNTER — CLAIMS CREATED FROM THE CLAIM WINDOW (OUTPATIENT)
Dept: URBAN - METROPOLITAN AREA SURGERY CENTER 30 | Facility: SURGERY CENTER | Age: 34
End: 2024-01-30
Payer: COMMERCIAL

## 2024-01-30 DIAGNOSIS — K50.10 CC (CROHN'S COLITIS): ICD-10-CM

## 2024-01-30 DIAGNOSIS — K50.00 CROHN'S DISEASE OF SMALL INTESTINE WITHOUT COMPLICATION: ICD-10-CM

## 2024-01-30 DIAGNOSIS — K31.89 OTHER DISEASES OF STOMACH AND DUODENUM: ICD-10-CM

## 2024-01-30 DIAGNOSIS — K63.89 OTHER SPECIFIED DISEASES OF INTESTINE: ICD-10-CM

## 2024-01-30 PROCEDURE — 00811 ANES LWR INTST NDSC NOS: CPT | Performed by: NURSE ANESTHETIST, CERTIFIED REGISTERED

## 2024-01-30 PROCEDURE — 45380 COLONOSCOPY AND BIOPSY: CPT | Performed by: INTERNAL MEDICINE

## 2024-01-30 PROCEDURE — 88305 TISSUE EXAM BY PATHOLOGIST: CPT | Performed by: PATHOLOGY

## 2024-01-30 PROCEDURE — G8907 PT DOC NO EVENTS ON DISCHARG: HCPCS | Performed by: INTERNAL MEDICINE

## 2024-02-01 ENCOUNTER — OV EP (OUTPATIENT)
Dept: URBAN - METROPOLITAN AREA CLINIC 74 | Facility: CLINIC | Age: 34
End: 2024-02-01

## 2024-02-01 RX ORDER — USTEKINUMAB 90 MG/ML
MAINTENANCE: INJECT 1 SYRINGE SUBCUTANEOUSLY EVERY 8 WEEKS. REFRIGERATE. DO NOT FREEZE INJECTION, SOLUTION SUBCUTANEOUS
Qty: 1 | Refills: 3 | OUTPATIENT

## 2024-02-01 RX ORDER — USTEKINUMAB 90 MG/ML
MAINTENANCE: INJECT 1 SYRINGE SUBCUTANEOUSLY EVERY 8 WEEKS. REFRIGERATE. DO NOT FREEZE INJECTION, SOLUTION SUBCUTANEOUS
Qty: 1 | Refills: 3 | Status: ACTIVE | COMMUNITY

## 2024-02-01 NOTE — HPI-TODAY'S VISIT:
The patient is a 34-year-old female with past medical history as noted below known to Dr. Boland, is presenting to our clinic today for follow-up appointment. The patient is currently on Stelara.   Diagnostic studies: -- Labs on 11/29/2023 CBC with hemoglobin 10.9, hematocrit 34.6, platelet 481.  CRP 17.2.  CMP with unremarkable result.    Procedures: -- Colonoscopy with biopsy on 12/20/2021 by Dr. Rider noted altered vascular, atrophic, and petechial mucosa in the rectosigmoid colon, in the descending colon, and in the rectum.  The examined portion of the ileum was normal.  Biopsy of terminal ileum with no significant histopathology.  Random biopsy of colon with focal active colitis.

## 2024-03-01 PROBLEM — 234347009: Status: ACTIVE | Noted: 2024-03-01

## 2024-03-08 ENCOUNTER — OV EP (OUTPATIENT)
Dept: URBAN - METROPOLITAN AREA CLINIC 74 | Facility: CLINIC | Age: 34
End: 2024-03-08

## 2024-03-08 RX ORDER — USTEKINUMAB 90 MG/ML
MAINTENANCE: INJECT 1 SYRINGE SUBCUTANEOUSLY EVERY 8 WEEKS. REFRIGERATE. DO NOT FREEZE INJECTION, SOLUTION SUBCUTANEOUS
Qty: 1 | Refills: 3 | COMMUNITY

## 2024-03-08 RX ORDER — USTEKINUMAB 90 MG/ML
MAINTENANCE: INJECT 1 SYRINGE SUBCUTANEOUSLY EVERY 8 WEEKS. REFRIGERATE. DO NOT FREEZE INJECTION, SOLUTION SUBCUTANEOUS
Qty: 1 | Refills: 3 | OUTPATIENT

## 2024-03-08 NOTE — HPI-TODAY'S VISIT:
The patient is a 34-year-old female with past medical history as noted below known to Dr. Rider is presenting to our clinic today to discuss her Colonoscopy results. The patient is currently on Stelara 90 MG/ML every 8 weeks in remission.   Diagnostic studies: -- Labs on 11/29/2023 CBC with hemoglobin 10.9, hematocrit 34.6, platelet 481.  CRP 17.2.  CMP with unremarkable result.    Procedures: -- Colonoscopy with biopsy on 01/30/2024 by Dr. Rider noted altered vascular and ulcerated mucosa in the sigmoid colon and in the descending colon.  Normal mucosa in the rectum, in the descending colon, at the splenic flexure, in the transverse colon, in the ascending colon, and in the cecum.  The examined portion of the ileum was normal.  The examination was otherwise normal.  Biopsy of colon with no significant abnormality.  -- Colonoscopy with biopsy on 12/20/2021 by Dr. Rider noted altered vascular, atrophic, and petechial mucosa in the rectosigmoid colon, in the descending colon, and in the rectum.  The examined portion of the ileum was normal.  Biopsy of terminal ileum with no significant histopathology.  Random biopsy of colon with focal active colitis.

## 2024-08-27 ENCOUNTER — TELEPHONE ENCOUNTER (OUTPATIENT)
Dept: URBAN - METROPOLITAN AREA CLINIC 40 | Facility: CLINIC | Age: 34
End: 2024-08-27

## 2024-11-11 NOTE — PHYSICAL EXAM CONSTITUTIONAL:
Morbidly obese, well developed, well nourished , in no acute distress , ambulating without difficulty , normal communication ability
none

## 2025-01-07 ENCOUNTER — ERX REFILL RESPONSE (OUTPATIENT)
Dept: URBAN - METROPOLITAN AREA CLINIC 74 | Facility: CLINIC | Age: 35
End: 2025-01-07

## 2025-01-07 RX ORDER — USTEKINUMAB 90 MG/ML
INJECT THE CONTENTS OF 1 SYRINGE UNDER THE SKIN EVERY 8 WEEKS. REFRIGERATE. DO NOT FREEZE. (MAINTENANCE) INJECTION, SOLUTION SUBCUTANEOUS
Qty: 1 MILLILITER | Refills: 0 | OUTPATIENT

## 2025-01-07 RX ORDER — USTEKINUMAB 90 MG/ML
MAINTENANCE: INJECT 1 SYRINGE SUBCUTANEOUSLY EVERY 8 WEEKS. REFRIGERATE. DO NOT FREEZE INJECTION, SOLUTION SUBCUTANEOUS
Qty: 1 | Refills: 3 | OUTPATIENT

## 2025-03-03 ENCOUNTER — TELEPHONE ENCOUNTER (OUTPATIENT)
Dept: URBAN - METROPOLITAN AREA CLINIC 40 | Facility: CLINIC | Age: 35
End: 2025-03-03

## 2025-03-03 RX ORDER — USTEKINUMAB 90 MG/ML
INJECT THE CONTENTS OF 1 SYRINGE UNDER THE SKIN EVERY 8 WEEKS. REFRIGERATE. DO NOT FREEZE. (MAINTENANCE) INJECTION, SOLUTION SUBCUTANEOUS
Qty: 1 MILLILITER | Refills: 0

## 2025-03-19 ENCOUNTER — TELEPHONE ENCOUNTER (OUTPATIENT)
Dept: URBAN - METROPOLITAN AREA CLINIC 40 | Facility: CLINIC | Age: 35
End: 2025-03-19